# Patient Record
Sex: FEMALE | Race: WHITE
[De-identification: names, ages, dates, MRNs, and addresses within clinical notes are randomized per-mention and may not be internally consistent; named-entity substitution may affect disease eponyms.]

---

## 2018-09-23 ENCOUNTER — HOSPITAL ENCOUNTER (EMERGENCY)
Dept: HOSPITAL 42 - ED | Age: 68
Discharge: HOME | End: 2018-09-23
Payer: MEDICARE

## 2018-09-23 VITALS — DIASTOLIC BLOOD PRESSURE: 78 MMHG | OXYGEN SATURATION: 98 % | SYSTOLIC BLOOD PRESSURE: 147 MMHG | HEART RATE: 87 BPM

## 2018-09-23 VITALS — TEMPERATURE: 97.3 F

## 2018-09-23 VITALS — BODY MASS INDEX: 21.2 KG/M2

## 2018-09-23 VITALS — RESPIRATION RATE: 17 BRPM

## 2018-09-23 DIAGNOSIS — I10: ICD-10-CM

## 2018-09-23 DIAGNOSIS — K52.9: Primary | ICD-10-CM

## 2018-09-23 DIAGNOSIS — E03.9: ICD-10-CM

## 2018-09-23 DIAGNOSIS — E11.9: ICD-10-CM

## 2018-09-23 LAB
ALBUMIN SERPL-MCNC: 4.2 G/DL (ref 3–4.8)
ALBUMIN/GLOB SERPL: 1.4 {RATIO} (ref 1.1–1.8)
ALT SERPL-CCNC: 17 U/L (ref 7–56)
APPEARANCE UR: CLEAR
AST SERPL-CCNC: 27 U/L (ref 14–36)
BACTERIA #/AREA URNS HPF: (no result) /[HPF]
BASOPHILS # BLD AUTO: 0.02 K/MM3 (ref 0–2)
BASOPHILS NFR BLD: 0.2 % (ref 0–3)
BILIRUB UR-MCNC: NEGATIVE MG/DL
BUN SERPL-MCNC: 8 MG/DL (ref 7–21)
CALCIUM SERPL-MCNC: 10.1 MG/DL (ref 8.4–10.5)
COLOR UR: YELLOW
EOSINOPHIL # BLD: 0 10*3/UL (ref 0–0.7)
EOSINOPHIL NFR BLD: 0.3 % (ref 1.5–5)
ERYTHROCYTE [DISTWIDTH] IN BLOOD BY AUTOMATED COUNT: 14.6 % (ref 11.5–14.5)
GFR NON-AFRICAN AMERICAN: > 60
GLUCOSE UR STRIP-MCNC: >=1000 MG/DL
GRANULOCYTES # BLD: 8.85 10*3/UL (ref 1.4–6.5)
GRANULOCYTES NFR BLD: 80.9 % (ref 50–68)
HGB BLD-MCNC: 14.3 G/DL (ref 12–16)
LEUKOCYTE ESTERASE UR-ACNC: (no result) LEU/UL
LIPASE SERPL-CCNC: 109 U/L (ref 23–300)
LYMPHOCYTES # BLD: 1.3 10*3/UL (ref 1.2–3.4)
LYMPHOCYTES NFR BLD AUTO: 11.6 % (ref 22–35)
MCH RBC QN AUTO: 27.6 PG (ref 25–35)
MCHC RBC AUTO-ENTMCNC: 35.7 G/DL (ref 31–37)
MCV RBC AUTO: 77.4 FL (ref 80–105)
MONOCYTES # BLD AUTO: 0.8 10*3/UL (ref 0.1–0.6)
MONOCYTES NFR BLD: 7 % (ref 1–6)
PH UR STRIP: 6.5 [PH] (ref 4.7–8)
PLATELET # BLD: 297 10^3/UL (ref 120–450)
PMV BLD AUTO: 9.4 FL (ref 7–11)
PROT UR STRIP-MCNC: NEGATIVE MG/DL
RBC # BLD AUTO: 5.18 10^6/UL (ref 3.5–6.1)
RBC # UR STRIP: NEGATIVE /UL
RBC #/AREA URNS HPF: (no result) /HPF (ref 0–2)
SP GR UR STRIP: 1.01 (ref 1–1.03)
TROPONIN I SERPL-MCNC: < 0.01 NG/ML
UROBILINOGEN UR STRIP-ACNC: 0.2 E.U./DL
WBC # BLD AUTO: 10.9 10^3/UL (ref 4.5–11)

## 2018-09-23 PROCEDURE — 85025 COMPLETE CBC W/AUTO DIFF WBC: CPT

## 2018-09-23 PROCEDURE — 83690 ASSAY OF LIPASE: CPT

## 2018-09-23 PROCEDURE — 81001 URINALYSIS AUTO W/SCOPE: CPT

## 2018-09-23 PROCEDURE — 99284 EMERGENCY DEPT VISIT MOD MDM: CPT

## 2018-09-23 PROCEDURE — 80053 COMPREHEN METABOLIC PANEL: CPT

## 2018-09-23 PROCEDURE — 93005 ELECTROCARDIOGRAM TRACING: CPT

## 2018-09-23 PROCEDURE — 87086 URINE CULTURE/COLONY COUNT: CPT

## 2018-09-23 PROCEDURE — 84484 ASSAY OF TROPONIN QUANT: CPT

## 2018-09-23 PROCEDURE — 76705 ECHO EXAM OF ABDOMEN: CPT

## 2018-09-23 PROCEDURE — 74177 CT ABD & PELVIS W/CONTRAST: CPT

## 2018-09-23 NOTE — US
Date of service: 



09/23/2018



HISTORY:

pain, ?gallstones



COMPARISON:

None.



TECHNIQUE:

Sonographic evaluation of the right upper quadrant of the abdomen.



FINDINGS:



LIVER:

Measures 12.6 cm in length. Mild diffusely increased echogenicity of 

the liver parenchyma.  Consistent with fatty infiltration.  Smooth 

contour.  No mass.  No biliary ductal dilatation.  Normal hepatopetal 

portal venous flow. 



GALLBLADDER:

Unremarkable. No gallstones. 



COMMON BILE DUCT:

Measures 3 mm.  No stones. No dilatation.



PANCREAS:

Unremarkable as visualized. No mass. No ductal dilatation.



RIGHT KIDNEY:

Measures 9.2 cm in length. Normal echogenicity. No calculus, mass, or 

hydronephrosis.



AORTA:

No aneurysmal dilatation.



IVC:

Unremarkable.



OTHER FINDINGS:

None .



IMPRESSION:

No evidence of cholelithiasis or cholecystitis.  Mild fatty 

infiltration of the liver.  Otherwise unremarkable examination.

## 2018-09-23 NOTE — ED PDOC
Arrival/HPI





- General


Chief Complaint: Abdominal Pain


Time Seen by Provider: 09/23/18 07:51


Historian: Patient,  ([place])





- History of Present Illness


Narrative History of Present Illness (Text): 


09/23/18 08:53


A 68 year old female, whose past medical history includes diabetes type 2, 

hypothyroidism, menopause, and hysterectomy, presents to the emergency 

department complaining of back pain for a few days. Patient is [place]-speaking 

and had  translate via phone. Patient reports back pain comes and 

goes, and when experiencing it, describes pain as "strong and sharp." States 

she was not performing any physical activity such as heavy lifting at the time 

pain began and mentions she has never had back pain before. She notes also 

experiencing some abdominal pain. Patient denies any fever, nausea, vomiting, 

diarrhea, or any other complaints at this time. 





No PMD











Past Medical History





- Provider Review


Nursing Documentation Reviewed: Yes





- Past History


Past History: No Previous





- Tetanus Immunization


Tetanus Immunization: Unknown





- Reproductive


Menopause: Yes





- Cardiac


Hx Hypertension: Yes


Hx Pacemaker: No





- Neurological


Hx Paralysis: No





- Endocrine/Metabolic


Hx Endocrine Disorders: Yes


Hx Diabetes Mellitus Type 2: Yes


Hx Hypothyroidism: Yes





- Hematological/Oncological


Hx Blood Transfusions: No


Hx Blood Transfusion Reaction: No





- Musculoskeletal/Rheumatological


Hx Musculoskeletal Disorders: No





- Psychiatric


Hx Emotional Abuse: No


Hx Physical Abuse: No


Hx Substance Use: No





- Past Surgical History


Past Surgical History: Unable to Obtain





- Surgical History


Hx Hysterectomy: Yes





- Anesthesia


Hx Anesthesia Reactions: No


Hx Malignant Hyperthermia: No





- Suicidal Assessment


Feels Threatened In Home Enviroment: No





Family/Social History





- Physician Review


Nursing Documentation Reviewed: Yes


Family/Social History: No Known Family HX


Smoking Status: Never Smoked


Hx Alcohol Use: No


Hx Substance Use: No


Hx Substance Use Treatment: No





Allergies/Home Meds


Allergies/Adverse Reactions: 


Allergies





No Known Allergies Allergy (Verified 09/23/18 07:56)


 








Home Medications: 


 Home Meds











 Medication  Instructions  Recorded  Confirmed


 


Aspirin [Aspirin EC] 81 mg PO DAILY 01/22/15 09/23/18


 


Levothyroxine Sodium 50 mcg PO DAILY 01/22/15 09/23/18


 


Sitagliptin Phosphate [Januvia] 100 mg PO DAILY 01/22/15 09/23/18


 


Ramipril [Altace] 2.5 mg PO DAILY 07/18/16 09/23/18


 


metFORMIN [glucOPHAGE] 1,000 mg PO BID 07/18/16 09/23/18


 


Atorvastatin [Lipitor] 80 mg PO DAILY 03/19/18 09/23/18


 


Cholecalciferol (Vitamin D3) 2,000 unit PO DAILY 03/19/18 09/23/18





[Vitamin D3]   


 


Ezetimibe [Zetia] 10 mg PO DAILY 03/19/18 09/23/18


 


Famotidine [Pepcid] 20 mg PO DAILY PRN 03/19/18 09/23/18














Review of Systems





- Physician Review


All systems were reviewed & negative as marked: Yes





- Review of Systems


Constitutional: absent: Fevers


Gastrointestinal: Abdominal Pain.  absent: Diarrhea, Nausea, Vomiting


Musculoskeletal: Back Pain





Physical Exam





- Physical Exam


Narrative Physical Exam (Text): 





Gen: VS reviewed, alert, well developed, well nourished, nontoxic, mild 

distress.


ENT: normal pharynx.


Eye: EOMI, PERRL.


Neck: no JVD, supple, no adenopathy.


CV: regular rate, regular rhythm, no rubs, no murmur, no gallops, S1, S2, 

pulses equal and strong.


Pulm: no distress, clear to auscultation, no wheeze, no rhonchi, breath sounds 

equal, no rales.


Abd: questionable left-sided tenderness, no guarding, no rebound, no rigidity, 

normal bowel sounds.


Ext: no edema.


Skin: good color, no rash, no cyanosis.


Psych: responds appropriately to questions, normal affect.


Neuro: oriented x 3, CN2-12 intact grossly, motor intact, sensation intact.


Bacl: CVA bilateral tenderness, no midline tenderness





Vital Signs Reviewed: Yes


Vital Signs











  Temp Pulse Resp BP Pulse Ox


 


 09/23/18 16:30  97.3 F L  87  17  147/78  98


 


 09/23/18 15:01  97.3 F L  90   153/87 H  100


 


 09/23/18 08:24  98.0 F  99 H  17  172/85 H  100


 


 09/23/18 07:53  98.5 F  103 H  18  172/85 H  98











Temperature: Afebrile


Blood Pressure: Hypertensive


Pulse: Regular


Respiratory Rate: Normal


Appearance: Positive for: Well-Appearing, Non-Toxic, Comfortable


Pain Distress: None


Mental Status: Positive for: Alert and Oriented X 3





Medical Decision Making


ED Course and Treatment: 


09/23/18 08:55


Impression: 68 year old female with back pain and abdominal pain. Physical exam 

shows bilaterally CVA tenderness to back, no midline tenderness; and 

questionable abdominal tenderness.





Plan:


-- Abd/Pelvis CT


-- Gallbladder and Pancreas Ultrasound


-- Labs


-- Tylenol


-- Urinalysis 


-- Urine Culture


-- Reassess and disposition





Progress Notes:


09/23/2018 11:51


Gallbladder & Pancreas Ultrasound


IMPRESSION: No evidence of cholelithiasis or cholecystitis. Mild fatty 

infiltration of liver. Otherwise unremarkable.


Dictator: Shay Noe MD





09/23/2018 15:27


Abd/Pelvis CT


IMPRESSION: Mural thickening of loops of small bowel in the pelvis consistent 

with nonspecific enteritis.  No bowel obstruction.  Diverticulosis of the 

splenic flexure and descending colon without evidence of diverticulitis.  

Status post hysterectomy.  Left inguinal hernia of mesenteric fat without 

herniated bowel.


Dictator: Shay Noe MD








- Lab Interpretations


Narrative Lab Interpretation (Text): 





Lab results on paper report all normal results except Glucose is mildly 

elevated at 211.





Lab Results: 








 09/23/18 09:30 





 Lab Results





09/23/18 09:30: Troponin I < 0.01


09/23/18 09:30: Urine Color Yellow, Urine Appearance Clear, Urine pH 6.5, Ur 

Specific Gravity 1.010, Urine Protein Negative, Urine Glucose (UA) >=1000, 

Urine Ketones Negative, Urine Blood Negative, Urine Nitrate Negative, Urine 

Bilirubin Negative, Urine Urobilinogen 0.2, Ur Leukocyte Esterase Small H, 

Urine RBC 0 - 2, Urine WBC 5 - 10, Ur Epithelial Cells 4 - 5, Urine Bacteria 

Many


09/23/18 09:30: WBC 10.9  D, RBC 5.18, Hgb 14.3, Hct 40.1, MCV 77.4 L, MCH 27.6

, MCHC 35.7, RDW 14.6 H, Plt Count 297, MPV 9.4, Gran % 80.9 H, Lymph % (Auto) 

11.6 L, Mono % (Auto) 7.0 H, Eos % (Auto) 0.3 L, Baso % (Auto) 0.2, Gran # 8.85 

H, Lymph # (Auto) 1.3, Mono # (Auto) 0.8 H, Eos # (Auto) 0.0, Baso # (Auto) 0.02








I have reviewed the lab results: Yes





- RAD Interpretation


Radiology Orders: 








09/23/18 08:58


GALLBLADDER & PANCREAS [US] Stat 





09/23/18 08:59


ABDOMEN & PELVIS [ABD PELVIS PO & IV CONTRAST] [CT] Stat 














- EKG Interpretation


EKG Interpretation (Text): 





09/23/18 09:45


0753: sinus rhythm at 100 bpm, nml qrs, nml axis, poor r wave progression, no 

ectopy


Interpreted by ED Physician: Yes





- Medication Orders


Current Medication Orders: 











Discontinued Medications





Acetaminophen (Tylenol 325mg Tab)  975 mg PO STAT STA


   Stop: 09/23/18 09:00


   Last Admin: 09/23/18 09:46  Dose: 975 mg





MAR Pain/Vitals


 Document     09/23/18 09:46    (Rec: 09/23/18 09:47    [facility] [id number])


     Pain Reassessment


      Is This A Pain ReAssessment?               Yes


     Sleep


      Is patient sleeping during reassessment?   No


     Presence of Pain


      Presence of Pain                           Yes


     Pain Scale Used


      Pain Scale Used                            Numeric


     Location


      Pain Location Body Site                    Back


      Description                                Intermittent


      Intensity                                  8


      Scale Used                                 Numeric


Re-Assess: MAR Pain/Vitals


 Document     09/23/18 10:46    (Rec: 09/23/18 11:23    [facility] [id number])


     Pain Reassessment


      Is This A Pain ReAssessment?               Yes


     Sleep


      Is patient sleeping during reassessment?   No


     Presence of Pain


      Presence of Pain                           Yes


     Pain Scale Used


      Pain Scale Used                            Numeric


     Location


      Pain Location Body Site                    Back


      Description                                Intermittent


      Intensity                                  5


      Scale Used                                 Numeric














- Scribe Statement


The provider has reviewed the documentation as recorded by the Elijah Vital





Provider Scribe Attestation:


All medical record entries made by the Elijah were at my direction and 

personally dictated by me. I have reviewed the chart and agree that the record 

accurately reflects my personal performance of the history, physical exam, 

medical decision making, and the department course for this patient. I have 

also personally directed, reviewed, and agree with the discharge instructions 

and disposition.








Disposition/Present on Arrival





- Present on Arrival


Any Indicators Present on Arrival: No


History of DVT/PE: No


History of Uncontrolled Diabetes: No


Urinary Catheter: No


History of Decub. Ulcer: No


History Surgical Site Infection Following: None





- Disposition


Have Diagnosis and Disposition been Completed?: Yes


Diagnosis: 


 Gastroenteritis





Disposition: HOME/ ROUTINE


Disposition Time: 17:29


Patient Plan: Discharge


Condition: STABLE


Discharge Instructions (ExitCare):  Gastroenteritis (ED)


Print Language: Moroccan


Additional Instructions: 


Follow up with your primary care doctor.





ELVIS SOLORZANO, thank you for letting us take care of you today. Your 

provider was Dr. Juan Diego Benjamin and you were treated for gastroenteritis. The 

emergency medical care you received today was directed at your acute symptoms. 

If you were prescribed any medication, please fill it and take as directed. It 

may take several days for your symptoms to resolve. Return to the Emergency 

Department if your symptoms worsen, do not improve, or if you have any other 

problems.





Please contact your doctor or call one of the physicians/clinics you have been 

referred to that are listed on the Patient Visit Information form that is 

included in your discharge packet. Bring any paperwork you were given at 

discharge with you along with any medications you are taking to your follow up 

visit. Our treatment cannot replace ongoing medical care by a primary care 

provider outside of the emergency department.





Thank you for allowing the [facility] team to be part of your care today.








If you had an X-Ray or CT scan: A Radiologist will review the ED reading if any 

change in treatment is needed we will contact you.***





If you had a blood, urine, or wound culture: It will take several days for the 

results, if any change in treatment is needed we will contact you.***





If you had an STI test: It will take 48 hours for the results. Please call 

after 1 week if you have not heard back.***


Prescriptions: 


Ciprofloxacin [Cipro] 500 mg PO BID 7 Days #14 tab


Ibuprofen [Motrin Tab] 600 mg PO QID #30 tab


Metronidazole [Flagyl] 500 mg PO BID 7 Days #14 tablet


Forms:  [employer] (English)

## 2018-09-23 NOTE — CARD
--------------- APPROVED REPORT --------------





Date of service: 09/23/2018



EKG Measurement

Heart [id number]

[id number]

[id number]

[id number]

[id number]



<Conclusion>

*** Poor data quality, interpretation may be adversely affected

Normal sinus rhythm

Cannot rule out Anterior infarct, age undetermined

Abnormal ECG